# Patient Record
Sex: MALE | Race: WHITE | NOT HISPANIC OR LATINO | Employment: FULL TIME | URBAN - METROPOLITAN AREA
[De-identification: names, ages, dates, MRNs, and addresses within clinical notes are randomized per-mention and may not be internally consistent; named-entity substitution may affect disease eponyms.]

---

## 2018-02-02 ENCOUNTER — OFFICE VISIT (OUTPATIENT)
Dept: MEDICAL GROUP | Facility: MEDICAL CENTER | Age: 23
End: 2018-02-02
Payer: COMMERCIAL

## 2018-02-02 VITALS
SYSTOLIC BLOOD PRESSURE: 110 MMHG | HEART RATE: 95 BPM | HEIGHT: 69 IN | WEIGHT: 125 LBS | TEMPERATURE: 98.7 F | BODY MASS INDEX: 18.51 KG/M2 | OXYGEN SATURATION: 95 % | DIASTOLIC BLOOD PRESSURE: 64 MMHG | RESPIRATION RATE: 16 BRPM

## 2018-02-02 DIAGNOSIS — Z00.00 PREVENTATIVE HEALTH CARE: ICD-10-CM

## 2018-02-02 DIAGNOSIS — G47.9 SLEEP DISORDER: ICD-10-CM

## 2018-02-02 DIAGNOSIS — Z76.89 ENCOUNTER TO ESTABLISH CARE WITH NEW DOCTOR: ICD-10-CM

## 2018-02-02 PROCEDURE — 99385 PREV VISIT NEW AGE 18-39: CPT | Performed by: PHYSICIAN ASSISTANT

## 2018-02-02 ASSESSMENT — PATIENT HEALTH QUESTIONNAIRE - PHQ9
CLINICAL INTERPRETATION OF PHQ2 SCORE: 2
SUM OF ALL RESPONSES TO PHQ QUESTIONS 1-9: 3
5. POOR APPETITE OR OVEREATING: 0 - NOT AT ALL

## 2018-02-02 NOTE — PROGRESS NOTES
Albert Edwards is a 22 y.o. new male here for establishing care.  HPI:    Patient is healthy currently not taking any medicine. Takes a daily vitamin. Patient currently uses vapor every day, not sure how much he smokes. States he drinks a lot and weekends about 15 drinks a day and about 2 drinks per day during the week.  Nightly to establish care and get the physical. Also states he has been having trouble sleeping at night for the past 2 months. Has tried Tylenol PM which helps him sleep.       Current medicines (including changes today)  No current outpatient prescriptions on file.     No current facility-administered medications for this visit.      He  has no past medical history on file.  He  has no past surgical history on file.  Social History   Substance Use Topics   • Smoking status: Current Every Day Smoker     Years: 3.00   • Smokeless tobacco: Never Used      Comment: gave pt a hand out of our smoking cessation classes and advised to discuss options with our provider.   • Alcohol use Yes      Comment: weekends     Social History     Social History Narrative   • No narrative on file     Family History   Problem Relation Age of Onset   • Kidney Disease Mother      Low kidney function   • Other Mother      HX: Brain Anneurism     Family Status   Relation Status   • Mother Alive   • Father Alive   • Brother Alive       Past medical, surgical, family, and social history is reviewed in Epic chart by me today.   Medications and allergies reviewed in Epic chart by me today.       ROS  General:  No fevers or chills, no night sweats or fatigue.   Eyes: no change in vision.   ENT:         Ears: No drainage. No change in hearing      Nose :No epitaxis        Mouth/ throat: No lesions. No sore throat  Resp: No difficulty breathing. No cough, wheezing.  CV: no SOB, no palpitation, no chest pain, no edema  GI: no nausea, no vomiting, no diarrhea or constipations. Bowel regular and normal. No melena or hematochezia. No  "hematemesis  : no Frequency, no urgency, no dysuria, no hematuria.   MS:  Negative for muscle pain or weakness.  Skin: no rashes or abnormal lesions.   Neuro: No seizures, no tingling or numbness.   Endo: no polyuria, no polydypsia, no cold intolerance, no heat intolerance  Psych: no depression, no anxiety, no Drug/Alcohol abuse  Hem: no easy bruising.    As documented in history of present illness  ROS neg:  All other review of systems were reviewed and negative.             Objective:     Blood pressure 110/64, pulse 95, temperature 37.1 °C (98.7 °F), resp. rate 16, height 1.74 m (5' 8.5\"), weight 56.7 kg (125 lb), SpO2 95 %. Body mass index is 18.73 kg/m².  Physical Exam:    Constitutional: Well-developed and well-nourished. No distress.   Skin: Skin is warm and dry. No rashes in visible areas.  Nail: w/o pitting, ridging or onychomycosis   Head: Atraumatic without lesions.  Eyes: Equal, round and reactive, conjunctiva clear,sclera non icteric, lids normal.  Ears:  External ears unremarkable. Tympanic membranes clear and intact.  Nose: Nares patent. Septum midline. Turbinates without erythema nor edema. No discharge.    Mouth/Throat: Dentition is good. Tongue normal. Oropharynx is clear and moist. Posterior pharynx without erythema or exudates.  Neck: Supple, trachea midline.  No thyromegaly present. No lymphadenopathy--cervical or supraclavicular  Cardiovascular: Regular rate and rhythm, without any murmurs.  No lower extremity edema. Cap refill < 3sec  Lung:  Clear to auscultation throughout w/o any wheeze or rhonchi. No adventitious sounds.    Abdomen: Soft, non tender, and without distention. No rebound, guarding, masses or HSM. No CVA tenderness  Musculoskeletal: All major joints without pain or swelling  Neurological: speech normal, mental status intact, gait grossly normal  Psychiatric:   Alert and oriented x3, normal affect and mood and behavior    Assessment and Plan:   The following treatment plan was " discussed      1. Preventative health care  Discussed with patient to cut back on alcohol drinking especially 15 drinks a day on weekends    - CBC WITH DIFFERENTIAL; Future  - COMP METABOLIC PANEL; Future  - LIPID PROFILE; Future  - TSH WITH REFLEX TO FT4; Future  - VITAMIN D,25 HYDROXY; Future    2. Encounter to establish care with new doctor    3. Sleep disorder  Advice good sleeping hygiene, melatonin 3mg at bedtime    Followup: Return if symptoms worsen or fail to improve.         Please note that this dictation was created using voice recognition software. I have made every reasonable attempt to correct obvious errors, but I expect that there are errors of grammar and possibly content that I did not discover before finalizing the note

## 2018-03-23 ENCOUNTER — APPOINTMENT (OUTPATIENT)
Dept: RADIOLOGY | Facility: IMAGING CENTER | Age: 23
End: 2018-03-23
Attending: PHYSICIAN ASSISTANT
Payer: COMMERCIAL

## 2018-03-23 ENCOUNTER — HOSPITAL ENCOUNTER (OUTPATIENT)
Facility: MEDICAL CENTER | Age: 23
End: 2018-03-23
Attending: PHYSICIAN ASSISTANT
Payer: COMMERCIAL

## 2018-03-23 ENCOUNTER — OFFICE VISIT (OUTPATIENT)
Dept: URGENT CARE | Facility: PHYSICIAN GROUP | Age: 23
End: 2018-03-23
Payer: COMMERCIAL

## 2018-03-23 VITALS
SYSTOLIC BLOOD PRESSURE: 110 MMHG | WEIGHT: 133 LBS | TEMPERATURE: 97.4 F | BODY MASS INDEX: 20.16 KG/M2 | OXYGEN SATURATION: 98 % | HEIGHT: 68 IN | HEART RATE: 60 BPM | DIASTOLIC BLOOD PRESSURE: 60 MMHG

## 2018-03-23 DIAGNOSIS — R36.9 PENILE DISCHARGE: ICD-10-CM

## 2018-03-23 DIAGNOSIS — G89.29 CHRONIC THUMB PAIN, RIGHT: ICD-10-CM

## 2018-03-23 DIAGNOSIS — M79.644 CHRONIC THUMB PAIN, RIGHT: ICD-10-CM

## 2018-03-23 DIAGNOSIS — Z72.51 RISKY SEXUAL BEHAVIOR: ICD-10-CM

## 2018-03-23 LAB
APPEARANCE UR: CLEAR
BILIRUB UR STRIP-MCNC: NORMAL MG/DL
COLOR UR AUTO: YELLOW
GLUCOSE UR STRIP.AUTO-MCNC: NORMAL MG/DL
KETONES UR STRIP.AUTO-MCNC: NORMAL MG/DL
LEUKOCYTE ESTERASE UR QL STRIP.AUTO: NORMAL
NITRITE UR QL STRIP.AUTO: NORMAL
PH UR STRIP.AUTO: 5 [PH] (ref 5–8)
PROT UR QL STRIP: NORMAL MG/DL
RBC UR QL AUTO: NORMAL
SP GR UR STRIP.AUTO: 1
UROBILINOGEN UR STRIP-MCNC: NORMAL MG/DL

## 2018-03-23 PROCEDURE — 73140 X-RAY EXAM OF FINGER(S): CPT | Mod: TC,RT | Performed by: PHYSICIAN ASSISTANT

## 2018-03-23 PROCEDURE — 99214 OFFICE O/P EST MOD 30 MIN: CPT | Mod: 25 | Performed by: PHYSICIAN ASSISTANT

## 2018-03-23 PROCEDURE — 87591 N.GONORRHOEAE DNA AMP PROB: CPT

## 2018-03-23 PROCEDURE — 87491 CHLMYD TRACH DNA AMP PROBE: CPT

## 2018-03-23 PROCEDURE — 81002 URINALYSIS NONAUTO W/O SCOPE: CPT | Performed by: PHYSICIAN ASSISTANT

## 2018-03-23 RX ORDER — CEFTRIAXONE SODIUM 250 MG/1
250 INJECTION, POWDER, FOR SOLUTION INTRAMUSCULAR; INTRAVENOUS ONCE
Status: COMPLETED | OUTPATIENT
Start: 2018-03-23 | End: 2018-03-23

## 2018-03-23 RX ORDER — AZITHROMYCIN 1 G/1
1 POWDER, FOR SUSPENSION ORAL ONCE
Qty: 1 EACH | Refills: 0 | Status: SHIPPED | OUTPATIENT
Start: 2018-03-23 | End: 2018-03-23

## 2018-03-23 RX ADMIN — CEFTRIAXONE SODIUM 250 MG: 250 INJECTION, POWDER, FOR SOLUTION INTRAMUSCULAR; INTRAVENOUS at 10:38

## 2018-03-23 ASSESSMENT — ENCOUNTER SYMPTOMS
BLURRED VISION: 0
NAUSEA: 0
SWEATS: 0
EYE DISCHARGE: 0
CHILLS: 0
FLANK PAIN: 0
FEVER: 0
EYE PAIN: 0
DOUBLE VISION: 0
VOMITING: 0
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
## 2018-03-23 NOTE — PATIENT INSTRUCTIONS
Chlamydia, Male  Chlamydia is an infection. It is spread through sexual contact. Chlamydia can be in different areas of the body. These areas include the urethra, throat, or rectum. It is important to treat chlamydia as soon as possible. It can damage other organs.   CAUSES   Chlamydia is caused by bacteria. It is a sexually transmitted disease. This means that it is passed from an infected partner during intimate contact. This contact could be with the genitals, mouth, or rectal area.   SIGNS AND SYMPTOMS   There may not be any symptoms. This is often the case early in the infection. If there are symptoms, they are usually mild and may only be noticeable in the morning. Symptoms you may notice include:   · Burning with urination.  · Pain or swelling in the testicles.  · Watery mucus-like discharge from the penis.  · Long-standing (chronic) pelvic pain after frequent infections.  · Pain, swelling, or itching around the anus.  · A sore throat.  · Itching, burning, or redness in the eyes, or discharge from the eyes.  DIAGNOSIS   To diagnose this infection, your health care provider will do a pelvic exam. A sample of urine or a swab from the rectum may be taken for testing.   TREATMENT   Chlamydia is treated with antibiotic medicines. Your health care provider may test you for infection again 3 months after treatment.  HOME CARE INSTRUCTIONS  · Take your antibiotic medicine as directed by your health care provider. Finish the antibiotic even if you start to feel better. Incomplete treatment will put you at risk for not being able to have children (sterility).    · Take medicines only as directed by your health care provider.    · Rest.    · Inform any sexual partners about your infection. Even if they are symptom free or have a negative culture or evaluation, they should be treated for the condition.    · Do not have sex (intercourse) until treatment is completed and your health care provider says it is okay.    · Keep  all follow-up visits as directed by your health care provider.    · Not all test results are available during your visit. If your test results are not back during the visit, make an appointment with your health care provider to find out the results. Do not assume everything is normal if you have not heard from your health care provider or the medical facility. It is your responsibility to get your test results.  SEEK MEDICAL CARE IF:  · You develop new joint pain.  · You have a fever.  SEEK IMMEDIATE MEDICAL CARE IF:   · Your pain increases.    · You have abnormal discharge.    · You have pain during intercourse.  MAKE SURE YOU:   · Understand these instructions.  · Will watch your condition.  · Will get help right away if you are not doing well or get worse.  This information is not intended to replace advice given to you by your health care provider. Make sure you discuss any questions you have with your health care provider.  Document Released: 12/18/2006 Document Revised: 01/08/2016 Document Reviewed: 06/26/2014  Elsevier Interactive Patient Education © 2017 [employer]

## 2018-03-25 LAB
C TRACH DNA SPEC QL NAA+PROBE: POSITIVE
N GONORRHOEA DNA SPEC QL NAA+PROBE: NEGATIVE
SPECIMEN SOURCE: ABNORMAL

## 2018-03-29 ENCOUNTER — TELEPHONE (OUTPATIENT)
Dept: URGENT CARE | Facility: PHYSICIAN GROUP | Age: 23
End: 2018-03-29

## 2018-03-29 NOTE — TELEPHONE ENCOUNTER
Contact the patient to notify of recent test results. Patient is notified of results and that he has been treated. Partners will need to be treated as well. Encouraged condom use.

## 2018-05-08 ENCOUNTER — HOSPITAL ENCOUNTER (OUTPATIENT)
Facility: MEDICAL CENTER | Age: 23
End: 2018-05-08
Attending: NURSE PRACTITIONER
Payer: COMMERCIAL

## 2018-05-08 ENCOUNTER — OFFICE VISIT (OUTPATIENT)
Dept: URGENT CARE | Facility: PHYSICIAN GROUP | Age: 23
End: 2018-05-08
Payer: COMMERCIAL

## 2018-05-08 VITALS
WEIGHT: 133 LBS | HEIGHT: 68 IN | HEART RATE: 68 BPM | BODY MASS INDEX: 20.16 KG/M2 | OXYGEN SATURATION: 98 % | DIASTOLIC BLOOD PRESSURE: 54 MMHG | TEMPERATURE: 98.8 F | SYSTOLIC BLOOD PRESSURE: 104 MMHG

## 2018-05-08 DIAGNOSIS — R31.9 URINARY TRACT INFECTION WITH HEMATURIA, SITE UNSPECIFIED: ICD-10-CM

## 2018-05-08 DIAGNOSIS — R36.9 DISCHARGE FROM PENIS: ICD-10-CM

## 2018-05-08 DIAGNOSIS — N39.0 URINARY TRACT INFECTION WITH HEMATURIA, SITE UNSPECIFIED: ICD-10-CM

## 2018-05-08 LAB
APPEARANCE UR: CLEAR
BILIRUB UR STRIP-MCNC: NEGATIVE MG/DL
COLOR UR AUTO: NORMAL
GLUCOSE UR STRIP.AUTO-MCNC: NEGATIVE MG/DL
KETONES UR STRIP.AUTO-MCNC: NEGATIVE MG/DL
LEUKOCYTE ESTERASE UR QL STRIP.AUTO: NORMAL
NITRITE UR QL STRIP.AUTO: NEGATIVE
PH UR STRIP.AUTO: 8 [PH] (ref 5–8)
PROT UR QL STRIP: NEGATIVE MG/DL
RBC UR QL AUTO: NORMAL
SP GR UR STRIP.AUTO: 1.01
UROBILINOGEN UR STRIP-MCNC: 0.2 MG/DL

## 2018-05-08 PROCEDURE — 81002 URINALYSIS NONAUTO W/O SCOPE: CPT | Performed by: NURSE PRACTITIONER

## 2018-05-08 PROCEDURE — 99000 SPECIMEN HANDLING OFFICE-LAB: CPT | Performed by: NURSE PRACTITIONER

## 2018-05-08 PROCEDURE — 87491 CHLMYD TRACH DNA AMP PROBE: CPT

## 2018-05-08 PROCEDURE — 99214 OFFICE O/P EST MOD 30 MIN: CPT | Mod: 25 | Performed by: NURSE PRACTITIONER

## 2018-05-08 PROCEDURE — 87591 N.GONORRHOEAE DNA AMP PROB: CPT

## 2018-05-08 RX ORDER — CEFTRIAXONE SODIUM 250 MG/1
250 INJECTION, POWDER, FOR SOLUTION INTRAMUSCULAR; INTRAVENOUS ONCE
Status: COMPLETED | OUTPATIENT
Start: 2018-05-08 | End: 2018-05-08

## 2018-05-08 RX ORDER — AZITHROMYCIN 500 MG/1
1000 TABLET, FILM COATED ORAL ONCE
Qty: 2 TAB | Refills: 0 | Status: SHIPPED | OUTPATIENT
Start: 2018-05-08 | End: 2018-05-08

## 2018-05-08 RX ORDER — NITROFURANTOIN 25; 75 MG/1; MG/1
100 CAPSULE ORAL 2 TIMES DAILY
Qty: 14 CAP | Refills: 0 | Status: SHIPPED | OUTPATIENT
Start: 2018-05-08

## 2018-05-08 RX ADMIN — CEFTRIAXONE SODIUM 250 MG: 250 INJECTION, POWDER, FOR SOLUTION INTRAMUSCULAR; INTRAVENOUS at 15:10

## 2018-05-08 ASSESSMENT — ENCOUNTER SYMPTOMS
CHILLS: 0
VOMITING: 0
ABDOMINAL PAIN: 0
BACK PAIN: 0
WEAKNESS: 0
NAUSEA: 0
FEVER: 0
MYALGIAS: 0

## 2018-05-08 NOTE — LETTER
May 8, 2018       Patient: Albert Edwards   YOB: 1995   Date of Visit: 5/8/2018         To Whom It May Concern:    It is my medical opinion that Albert Edwards be excused from work absences in last 2 days due to illness. May return tomorrow.    If you have any questions or concerns, please don't hesitate to call [phone]          Sincerely,          VERNON Matta.N.P.  Electronically Signed

## 2018-05-08 NOTE — PROGRESS NOTES
"Subjective:      Albert Edwards is a 23 y.o. male who presents with Dysuria (Pt was seen for STI in March.  Still experiencing sx.  )            HPI  Albert is here for d/c from penis. Was seen in  3/2018 for similar symptoms. Admits to not getting azithromycin at pharmacy. Denies fever, low abdominal pain or back pain. States has had d/c since seen last time. Was POS for Chlamydia last time.    PMH:  has no past medical history on file.  MEDS:   Current Outpatient Prescriptions:   •  azithromycin (ZITHROMAX) 500 MG tablet, Take 2 Tabs by mouth Once for 1 dose., Disp: 2 Tab, Rfl: 0  •  nitrofurantoin monohydr macro (MACROBID) 100 MG Cap, Take 1 Cap by mouth 2 times a day., Disp: 14 Cap, Rfl: 0    Current Facility-Administered Medications:   •  cefTRIAXone (ROCEPHIN) injection 250 mg, 250 mg, Intramuscular, Once, Maegan Quigley, F.N.P.  ALLERGIES:   Allergies   Allergen Reactions   • Amoxicillin Rash     Rash     • Pcn [Penicillins] Rash     Rash     SURGHX: History reviewed. No pertinent surgical history.  SOCHX:  reports that he has been smoking.  He has smoked for the past 3.00 years. He has never used smokeless tobacco. He reports that he drinks alcohol. He reports that he uses drugs, including Marijuana.  FH: Family history was reviewed, no pertinent findings to report    Review of Systems   Constitutional: Negative for chills, fever and malaise/fatigue.   Gastrointestinal: Negative for abdominal pain, nausea and vomiting.   Genitourinary: Negative for dysuria, frequency, hematuria and urgency.   Musculoskeletal: Negative for back pain and myalgias.   Neurological: Negative for weakness.   All other systems reviewed and are negative.         Objective:     /54   Pulse 68   Temp 37.1 °C (98.8 °F)   Ht 1.727 m (5' 8\")   Wt 60.3 kg (133 lb)   SpO2 98%   BMI 20.22 kg/m²      Physical Exam   Constitutional: He is oriented to person, place, and time. Vital signs are normal. He appears well-developed " and well-nourished. He is active and cooperative.  Non-toxic appearance. He does not have a sickly appearance. He does not appear ill. No distress.   HENT:   Head: Normocephalic.   Eyes: Conjunctivae and EOM are normal. Pupils are equal, round, and reactive to light.   Neck: Normal range of motion. Neck supple.   Cardiovascular: Normal rate and regular rhythm.    Pulmonary/Chest: Effort normal and breath sounds normal.   Abdominal: Soft. Bowel sounds are normal. He exhibits no distension. There is no tenderness. There is no rigidity, no rebound, no guarding and no CVA tenderness.   Musculoskeletal: Normal range of motion.   Neurological: He is alert and oriented to person, place, and time.   Skin: Skin is warm and dry. He is not diaphoretic.   Vitals reviewed.              Assessment/Plan:     1. Discharge from penis    - CHLAMYDIA/GC PCR URINE OR SWAB; Future  - POCT Urinalysis  - cefTRIAXone (ROCEPHIN) injection 250 mg; 250 mg by Intramuscular route Once.  - azithromycin (ZITHROMAX) 500 MG tablet; Take 2 Tabs by mouth Once for 1 dose.  Dispense: 2 Tab; Refill: 0    2. Urinary tract infection with hematuria, site unspecified    - nitrofurantoin monohydr macro (MACROBID) 100 MG Cap; Take 1 Cap by mouth 2 times a day.  Dispense: 14 Cap; Refill: 0    Encouraged patient to go directly to pharmacy to  meds for possible STD and UTI, patient understands this  Avoid unprotected sexual intercourse   Monitor for penial discharge, pain with urination, blood or lower abdominal or back pain-  Need re-evaluation     Call with results

## 2018-05-08 NOTE — LETTER
May 8, 2018         Patient: Albert Edwards   YOB: 1995   Date of Visit: 5/8/2018           To Whom it May Concern:    Albert Edwards was seen in my clinic on 5/8/2018. He excuse him from work for the dates of 5/7 and 5/8, returning to work on 5/9/18.     If you have any questions or concerns, please don't hesitate to call.        Sincerely,           VERNON Matta.N.TA.  Electronically Signed

## 2018-05-09 DIAGNOSIS — R36.9 DISCHARGE FROM PENIS: ICD-10-CM

## 2018-05-10 ENCOUNTER — TELEPHONE (OUTPATIENT)
Dept: URGENT CARE | Facility: PHYSICIAN GROUP | Age: 23
End: 2018-05-10

## 2018-05-11 NOTE — TELEPHONE ENCOUNTER
Called and spoke to patient regarding culture to been POS for Chlamydia and states he has taken his rx azithromycin as prescribed.

## 2022-05-19 ENCOUNTER — NURSE TRIAGE (OUTPATIENT)
Dept: ADMINISTRATIVE | Facility: CLINIC | Age: 27
End: 2022-05-19
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
